# Patient Record
Sex: FEMALE | Race: BLACK OR AFRICAN AMERICAN | NOT HISPANIC OR LATINO | URBAN - METROPOLITAN AREA
[De-identification: names, ages, dates, MRNs, and addresses within clinical notes are randomized per-mention and may not be internally consistent; named-entity substitution may affect disease eponyms.]

---

## 2023-10-10 ENCOUNTER — LAB OUTSIDE AN ENCOUNTER (OUTPATIENT)
Dept: URBAN - METROPOLITAN AREA CLINIC 128 | Facility: CLINIC | Age: 48
End: 2023-10-10

## 2023-10-10 ENCOUNTER — OFFICE VISIT (OUTPATIENT)
Dept: URBAN - METROPOLITAN AREA CLINIC 128 | Facility: CLINIC | Age: 48
End: 2023-10-10
Payer: COMMERCIAL

## 2023-10-10 VITALS
SYSTOLIC BLOOD PRESSURE: 170 MMHG | BODY MASS INDEX: 43.65 KG/M2 | HEIGHT: 65 IN | HEART RATE: 75 BPM | WEIGHT: 262 LBS | TEMPERATURE: 97.9 F | DIASTOLIC BLOOD PRESSURE: 90 MMHG

## 2023-10-10 DIAGNOSIS — K58.2 IRRITABLE BOWEL SYNDROME WITH BOTH CONSTIPATION AND DIARRHEA: ICD-10-CM

## 2023-10-10 DIAGNOSIS — E11.9 TYPE 2 DIABETES MELLITUS WITHOUT COMPLICATION, WITHOUT LONG-TERM CURRENT USE OF INSULIN: ICD-10-CM

## 2023-10-10 DIAGNOSIS — K21.9 GASTROESOPHAGEAL REFLUX DISEASE, UNSPECIFIED WHETHER ESOPHAGITIS PRESENT: ICD-10-CM

## 2023-10-10 PROBLEM — 10743008: Status: ACTIVE | Noted: 2023-10-10

## 2023-10-10 PROBLEM — 313436004: Status: ACTIVE | Noted: 2023-10-10

## 2023-10-10 PROCEDURE — 99205 OFFICE O/P NEW HI 60 MIN: CPT | Performed by: STUDENT IN AN ORGANIZED HEALTH CARE EDUCATION/TRAINING PROGRAM

## 2023-10-10 RX ORDER — VALACYCLOVIR HCL 500 MG
TABLET ORAL
Qty: 0 | Refills: 0 | Status: ACTIVE | COMMUNITY
Start: 1900-01-01 | End: 1900-01-01

## 2023-10-10 NOTE — HPI-TODAY'S VISIT:
10/10/2023:  Tomasz: The pt is a 47 yo F who presents for concerns of reflux and colonoscopy.  Symptoms ongoing for 15 years Symptoms  IBS and GERD.  Dysphagia with water.  Satiety.  Fullness, constipation.    Indigestion.  If she is not getting the shots, she notices constipation.  Loose stools.  Has been on metformin.  Medications Ozempic (4 months ago), Buspar, lisinorpril, omeprazole and atrovastatin (In oct)was introduced. Medications that helped Prior endoscopy  13 years ago was normal. Colonoscopy was about 5 years ago with dr. Ja sawyer and was normal except for internal hemorrhoids.  Recommended a 10 year follow up.  Smoking hx None Alcohol use  wine (Merlot or cabernet) measuring 5 oz daily with dinner - 1 glass.  cocktails in addition. Other recreational drugs none No concerns  for liver disease. FH of luminal GI cancers:  Father  in his 40s.  Alcohol and esophageal cancer.

## 2023-11-03 ENCOUNTER — OFFICE VISIT (OUTPATIENT)
Dept: URBAN - METROPOLITAN AREA CLINIC 128 | Facility: CLINIC | Age: 48
End: 2023-11-03

## 2023-12-06 ENCOUNTER — OUT OF OFFICE VISIT (OUTPATIENT)
Dept: URBAN - METROPOLITAN AREA SURGERY CENTER 31 | Facility: SURGERY CENTER | Age: 48
End: 2023-12-06
Payer: COMMERCIAL

## 2023-12-06 ENCOUNTER — TELEPHONE ENCOUNTER (OUTPATIENT)
Dept: URBAN - METROPOLITAN AREA CLINIC 19 | Facility: CLINIC | Age: 48
End: 2023-12-06

## 2023-12-06 DIAGNOSIS — K31.89 ACHYLIA: ICD-10-CM

## 2023-12-06 DIAGNOSIS — K22.9 IRREGULAR Z LINE OF ESOPHAGUS: ICD-10-CM

## 2023-12-06 DIAGNOSIS — Z12.11 COLON CANCER SCREENING (HIGH RISK): ICD-10-CM

## 2023-12-06 DIAGNOSIS — K21.00 ALKALINE REFLUX ESOPHAGITIS: ICD-10-CM

## 2023-12-06 DIAGNOSIS — K21.9 GASTRO - ESOPHAGEAL REFLUX DISEASE: ICD-10-CM

## 2023-12-06 DIAGNOSIS — K20.80 ESOPHAGITIS, LOS ANGELES GRADE B: ICD-10-CM

## 2023-12-06 DIAGNOSIS — Z12.11 COLON CANCER SCREENING: ICD-10-CM

## 2023-12-06 PROCEDURE — 00813 ANES UPR LWR GI NDSC PX: CPT | Performed by: NURSE ANESTHETIST, CERTIFIED REGISTERED

## 2023-12-06 PROCEDURE — 43239 EGD BIOPSY SINGLE/MULTIPLE: CPT | Performed by: STUDENT IN AN ORGANIZED HEALTH CARE EDUCATION/TRAINING PROGRAM

## 2023-12-06 PROCEDURE — G8907 PT DOC NO EVENTS ON DISCHARG: HCPCS | Performed by: STUDENT IN AN ORGANIZED HEALTH CARE EDUCATION/TRAINING PROGRAM

## 2023-12-06 PROCEDURE — 45378 DIAGNOSTIC COLONOSCOPY: CPT | Performed by: STUDENT IN AN ORGANIZED HEALTH CARE EDUCATION/TRAINING PROGRAM

## 2023-12-06 RX ORDER — OMEPRAZOLE 40 MG/1
1 CAPSULE 30 MINUTES BEFORE MORNING MEAL CAPSULE, DELAYED RELEASE ORAL ONCE A DAY
Qty: 30 | Refills: 3 | OUTPATIENT
Start: 2023-12-06

## 2023-12-06 RX ORDER — VALACYCLOVIR HCL 500 MG
TABLET ORAL
Qty: 0 | Refills: 0 | Status: ACTIVE | COMMUNITY
Start: 1900-01-01 | End: 1900-01-01

## 2024-01-03 ENCOUNTER — TELEPHONE ENCOUNTER (OUTPATIENT)
Dept: URBAN - METROPOLITAN AREA CLINIC 128 | Facility: CLINIC | Age: 49
End: 2024-01-03

## 2024-01-08 ENCOUNTER — TELEPHONE ENCOUNTER (OUTPATIENT)
Dept: URBAN - METROPOLITAN AREA CLINIC 86 | Facility: CLINIC | Age: 49
End: 2024-01-08

## 2024-01-08 PROBLEM — 44054006: Status: ACTIVE | Noted: 2024-01-08

## 2024-01-08 PROBLEM — 59621000: Status: ACTIVE | Noted: 2024-01-08

## 2024-01-08 PROBLEM — 267434003: Status: ACTIVE | Noted: 2024-01-08

## 2024-01-08 PROBLEM — 266433003: Status: ACTIVE | Noted: 2024-01-08

## 2024-01-08 PROBLEM — 443913008: Status: ACTIVE | Noted: 2024-01-08

## 2024-01-08 PROBLEM — 453861000124107: Status: ACTIVE | Noted: 2024-01-08

## 2024-01-08 PROBLEM — 238136002: Status: ACTIVE | Noted: 2024-01-08

## 2024-01-08 NOTE — HPI-TODAY'S VISIT:
Patient is a 48-year-old female who we are being asked to see for abnormal liver labs.A copy of the note will be sent to the referring provider. Patient was last seen in 2023 by Dr. Tolbert at the [place] office for evaluation of GERD/colonoscopy consultation. Patient listed as being on Ozempic at the time for her diabetes and they talked about some timing of it to be held for her procedure. They mention that she has a history of elevated BMI and type 2 diabetes and was at risk for fatty liver. Weight at the time was listed as being 262 with a height of 65 and BMI 43.59. Patient listed as having potential risk factors for fatty liver being her obesity, and the diabetes issues.  EGD done showed grade B esophagitis in the stomach appeared normal as well as the duodenum.  There was some scalloped mucosa in the first part of duodenum with scalloping close the second part duodenum and biopsies were done to rule out celiac disease.  Colonoscopy also done same day showed exam was otherwise normal and no specimens were obtained from the colon.  Path report showed from duodenum small bowel mucosa with no significant histopathology and no increase in intra epithelial lymphocytes and no evidence of celiac disease or infectious microorganisms.  Distal esophagus biopsies showed some changes of reflux esophagitis and squamocolumnar junctional mucosa. Approximately 23 pages of material were sent from Dr. AYERS's office for review. On 2023 patient noted to have glucose 107 BUN of 12 creatinine 0.72 sodium 142 potassium 4.7 chloride 104 CO2 23 calcium 9.9 albumin 4.5 bilirubin 0.4 alk phos 134 AST of 24 and ALT of 47.  Hep B IgM negative B surface antigen negative B core IgM negative and hep C antibody negative.  Alkaline phosphatase was fractionated as it was elevated and was 44% liver, bone 17% and curiously 39% elevated from intestinal fraction. Other labs that we saw showed glucose 158, BUN of 10 creatinine 0.74 sodium 138 potassium 4.2 albumin 3.9 bilirubin 0.5 alk phos 125 AST of 22 ALT 45 cholesterol 110 triglycerides 118 HDL 45 LDL 44 and these appear to be from .  A1c was 6.2%. Last clinic visit note that we could see here from January shows the patient has a height of 65 inches and a weight of 261 and a BMI of 43.43. Medical problems this recent upper respiratory tract infection, hypertension, right trapezius muscle spasm, morbid obesity, carpal tunnel syndrome bilaterally, history of migraines, diabetes, glaucoma, family history ovarian cancer, history of anxiety, history of HSV, history of hemorrhoids, history of GERD, and vitamin D deficiency. Meds listed included Ozempic she is on the 1 mg dose once a week, buspirone 10 mg once a day as needed, omeprazole 40 mg a day.  Lisinopril 2.5 mg a day and Lipitor 10 mg a day.  Cyclobenzaprine 10 mg twice a day as needed, metformin 500 mg once a day, Valtrex 500 mg once a day, and hydroxyzine 10 mg 3 times a day as needed. Allergies are to tramadol and Percocet and penicillin. Patient apparently when was seen was about to start her on Ozempic again as she had been off of it for about 6 to 8 weeks.  She was having some issues with the prior pharmacy. Past surgeries include  , ovary cyst surgery , EGD colonoscopy 2023. Social history not a smoker and occasional social alcohol.  Mainly in the form of wine. 1. fatty liver noted and patient certainly with risk factors for this in the form of morbid obesity as well as the diabetes and needs to work on her risk factors.  Current meds she is on now with both the diabetes as well as also with the weight loss aspect.  Need to see how she does over time and hopefully she will not have significant fibrosis noted.  The fib 4 index.  The ultrasound with elastography consider more advanced imaging as needed.  Hopefully with the treatment and continued care by the [facility] they can get this improved upon.  New medicines coming up possibly later this year for and we can see if she is a candidate for those when they are out. 2.  Morbid obesity noted and needs to continue to work on weight and exercise and see if she can lose at 5 to 10% or more and also see what she can do while doing so to indirectly help her diabetes and the other fatty liver risk factors.  Definitely with Ozempic and Mounjaro class of meds she can certainly lose substantial weight as well with those. 3.  Type 2 diabetes and working with team regarding same.  Certainly a major risk factor for fatty liver and 80% of people with diabetes will have a fatty liver.  With Ozempic she should hopefully do better on the diabetes as well as the weight loss aspect. 4.  Vaccine counseling needs check for hep A/B consider vaccination for same. 5.  High risk medicine usage noted on medications and follow-up. 6.  Hyperlipidemia on her statin therapy and follow-up. 7.  Hypertension noted on medications. 8.  History of colonoscopy with no findings in . 9.  EGD done in  with grade B esophagitis and on treatment for same. Plan 1.  Needs fib 4 index score to assess for fibrosis risk.2.  Needs ultrasound with elastography to assess for fibrosis estimate and to overall see liver size and echotexture.3.  Needs to work on identified risk factors such as weight, and her diabetes and keep this optimized is much as possible.4.  Exercise such as walking 30 minutes a day or cumulative 150 to 300 minutes a week to be quite helpful for this.5.  Need to follow course if she continues to proceed with her treatment options.6.  Plan to reassess patient again in about 2 months and see how they are doing to be TeleMed visit.7.  May need more advanced imaging pending above. Duration of the visit was minutes with 10 minutes of chart prep and 20 minutes of face to face/TeleMed visit with time spent reviewing historical and recent records, discussing their current status relative to same and reviewing future plans for the patient.

## 2024-01-09 ENCOUNTER — LAB OUTSIDE AN ENCOUNTER (OUTPATIENT)
Dept: URBAN - METROPOLITAN AREA CLINIC 86 | Facility: CLINIC | Age: 49
End: 2024-01-09

## 2024-01-09 ENCOUNTER — OFFICE VISIT (OUTPATIENT)
Dept: URBAN - METROPOLITAN AREA CLINIC 86 | Facility: CLINIC | Age: 49
End: 2024-01-09
Payer: COMMERCIAL

## 2024-01-09 VITALS
BODY MASS INDEX: 43.65 KG/M2 | WEIGHT: 262 LBS | SYSTOLIC BLOOD PRESSURE: 133 MMHG | TEMPERATURE: 97.1 F | DIASTOLIC BLOOD PRESSURE: 87 MMHG | HEIGHT: 65 IN | HEART RATE: 87 BPM

## 2024-01-09 DIAGNOSIS — K21.00 GASTROESOPHAGEAL REFLUX DISEASE WITH ESOPHAGITIS WITHOUT HEMORRHAGE: ICD-10-CM

## 2024-01-09 DIAGNOSIS — K76.0 FATTY LIVER: ICD-10-CM

## 2024-01-09 DIAGNOSIS — I10 ESSENTIAL HYPERTENSION: ICD-10-CM

## 2024-01-09 DIAGNOSIS — E66.01 MORBID OBESITY: ICD-10-CM

## 2024-01-09 PROCEDURE — 99215 OFFICE O/P EST HI 40 MIN: CPT

## 2024-01-09 PROCEDURE — 99245 OFF/OP CONSLTJ NEW/EST HI 55: CPT

## 2024-01-09 RX ORDER — LISINOPRIL 2.5 MG/1
1 TABLET TABLET ORAL ONCE A DAY
Status: ACTIVE | COMMUNITY

## 2024-01-09 RX ORDER — SEMAGLUTIDE 0.68 MG/ML
AS DIRECTED INJECTION, SOLUTION SUBCUTANEOUS
Status: ACTIVE | COMMUNITY

## 2024-01-09 RX ORDER — OMEPRAZOLE 40 MG/1
1 CAPSULE 30 MINUTES BEFORE MORNING MEAL CAPSULE, DELAYED RELEASE ORAL ONCE A DAY
Qty: 30 | Refills: 3 | COMMUNITY
Start: 2023-12-06

## 2024-01-09 RX ORDER — ATORVASTATIN CALCIUM 10 MG/1
1 TABLET TABLET, FILM COATED ORAL ONCE A DAY
Status: ACTIVE | COMMUNITY

## 2024-01-09 RX ORDER — VALACYCLOVIR HCL 500 MG
1 TABLET TABLET ORAL ONCE A DAY
Refills: 0 | Status: ON HOLD | COMMUNITY
Start: 1900-01-01

## 2024-01-09 RX ORDER — METFORMIN HYDROCHLORIDE 500 MG/1
1 TABLET WITH EVENING MEAL TABLET, EXTENDED RELEASE ORAL ONCE A DAY
Status: ACTIVE | COMMUNITY

## 2024-01-09 NOTE — EXAM-PHYSICAL EXAM
Gen: awake and responsive. Eyes: anicteric,normal lids. Mouth: has mask on. Nose: has mask on. Hearing: intact grossly. Neck: trachea midline and no jvd. CV: RRR no s3. Lungs: clear. No wheezes. Abd: Soft, nabs, nr,NT. No appreciable hsm. Ext: no sig edema, and some  palm erythema. Neuro: moves all 4 ext grossly. No asterixis. Skin: no pruritis and some palm erythema.

## 2024-01-09 NOTE — HPI-TODAY'S VISIT:
Patient is a 48-year-old female who we are being asked to see for abnormal liver labs and suspicious for fatty liver.  A copy of the note will be sent to the referring provider.  Patient was last seen in 2023 by Dr. Tolbert at the [place] office for evaluation of GERD/colonoscopy consultation.  Patient listed as being ordered to be on Ozempic at the time for her diabetes and they talked about some timing of it to be held for her procedure.  Since then she started the ozempic 0.25mg.  She started that dose .  She had been prior on it from 3m summer to  and she went to 1mg dose and then pharmacy had not been able to get that.  She lost 10 or so with thay.   [facility] notesL   They mention that she has a history of elevated BMI and type 2 diabetes and was at risk for fatty liver. They did not really discuss it.  Weight at the time was listed as being 262 with a height of 65 and BMI 43.59.  Patient listed as having potential risk factors for fatty liver being her obesity, and the pre/diabetes issues.  2023 EGD done showed grade B esophagitis in the stomach appeared normal as well as the duodenum.  There was some scalloped mucosa in the first part of duodenum with scalloping close the second part duodenum and biopsies were done to rule out celiac disease.  Colonoscopy also done same day showed exam was otherwise normal and no specimens were obtained from the colon.  Path report showed from duodenum small bowel mucosa with no significant histopathology and no increase in intra epithelial lymphocytes and no evidence of celiac disease or infectious microorganisms.  Distal esophagus biopsies showed some changes of reflux esophagitis and squamocolumnar junctional mucosa.  Approximately 23 pages of material were sent from Dr. AYERS's office for review.  End of dec 2023 ast 24 and alt 47 and alk 134.  On 2023 patient noted to have glucose 107 BUN of 12 creatinine 0.72 sodium 142 potassium 4.7 chloride 104 CO2 23 calcium 9.9 albumin 4.5 bilirubin 0.4 alk phos 134 AST of 24 and ALT of 47.  Hep B IgM negative B surface antigen negative B core IgM negative and hep C antibody negative.  Alkaline phosphatase was fractionated as it was elevated and was 44% liver, bone 17% and curiously 39% elevated from intestinal fraction. Other labs that we saw showed glucose 158, BUN of 10 creatinine 0.74 sodium 138 potassium 4.2 albumin 3.9 bilirubin 0.5 alk phos 125 AST of 22 ALT 45 cholesterol 110 triglycerides 118 HDL 45 LDL 44 and these appear to be from .  A1c was 6.2%.  Last clinic visit note that we could see here from January shows the patient has a height of 65 inches and a weight of 261 and a BMI of 43.43.  Medical problems this recent upper respiratory tract infection, hypertension, right trapezius muscle spasm, morbid obesity, carpal tunnel syndrome bilaterally, history of migraines, diabetes, glaucoma, family history ovarian cancer, history of anxiety, history of HSV, history of hemorrhoids, history of GERD, and vitamin D deficiency.  Meds listed included Ozempic she is on the 1 mg dose once a week, buspirone 10 mg once a day as needed, omeprazole 40 mg a day.  Lisinopril 2.5 mg a day and Lipitor 10 mg a day.  Cyclobenzaprine 10 mg twice a day as needed, metformin 500 mg once a day, Valtrex 500 mg once a day, and hydroxyzine 10 mg 3 times a day as needed.  Allergies are to tramadol and Percocet and penicillin.  Patient apparently when was seen was about to start her on Ozempic again as she had been off of it for about 6 to 8 weeks.  She was having some issues with the prior pharmacy.  Past surgeries include  , ovary cyst surgery , EGD colonoscopy 2023.  Social history not a smoker and occasional social alcohol.  Mainly in the form of wine.  Plan 1.  Needs fib 4 index score to assess for fibrosis risk.  2.  Needs ultrasound with elastography to assess for fibrosis estimate and to overall see liver size and echotexture.  3.  Needs to work on identified risk factors such as weight, and her diabetes and keep this optimized is much as possible. She is now back on ozempic and that will help. Labs lower when on ozempic earlier in year.  4.  Exercise such as walking 30 minutes a day or cumulative 150 to 300 minutes a week to be quite helpful for the fatty liver.  5.  Need to follow course and proceed with ozempic titration and that prior may have helped as labs lower earlier in the year.   6.  Plan to reassess patient again in about 2 months and see how they are doing to be TeleMed visit.  7.  May need more advanced imaging pending above.   Duration of the visit was 80 minutes with 40 minutes of chart prep and loading the labs and notes to chart from karely and Dr AYERS's office and then 40 minutes of face to face visit with time spent reviewing historical and recent records, discussing their current status relative to same and reviewing future plans for the patient.

## 2024-01-12 ENCOUNTER — WEB ENCOUNTER (OUTPATIENT)
Dept: URBAN - METROPOLITAN AREA CLINIC 128 | Facility: CLINIC | Age: 49
End: 2024-01-12

## 2024-01-30 ENCOUNTER — TELEPHONE ENCOUNTER (OUTPATIENT)
Dept: URBAN - METROPOLITAN AREA CLINIC 86 | Facility: CLINIC | Age: 49
End: 2024-01-30

## 2024-01-30 LAB
ABSOLUTE BASOPHILS: 33
ABSOLUTE EOSINOPHILS: 150
ABSOLUTE LYMPHOCYTES: 2457
ABSOLUTE MONOCYTES: 605
ABSOLUTE NEUTROPHILS: 3257
ALBUMIN/GLOBULIN RATIO: 1.6
ALBUMIN/GLOBULIN RATIO: 1.6
ALBUMIN: 4.3
ALBUMIN: 4.3
ALKALINE PHOSPHATASE: 107
ALKALINE PHOSPHATASE: 107
ALT (SGPT): 26
ALT: 26
AST (SGOT): 16
AST: 16
BASOPHILS: 0.5
BILIRUBIN, DIRECT: 0.2
BILIRUBIN, INDIRECT: 0.4
BILIRUBIN, TOTAL: 0.6
BILIRUBIN, TOTAL: 0.6
BUN/CREATININE RATIO: (no result)
CALCIUM: 9.8
CARBON DIOXIDE: 30
CHLORIDE: 101
CREATININE: 0.69
EGFR: 106
EOSINOPHILS: 2.3
FIB 4 INDEX: 0.58
FIB 4 INTERPRETATION: (no result)
GLOBULIN: 2.7
GLOBULIN: 2.7
GLUCOSE: 95
HEMATOCRIT: 37.5
HEMOGLOBIN: 12.8
HEPATITIS A AB, TOTAL: (no result)
HEPATITIS B CORE AB TOTAL: (no result)
HEPATITIS B SURFACE AB IMMUNITY, QN: <5
HEPATITIS B SURFACE ANTIGEN: (no result)
HEPATITIS C ANTIBODY: (no result)
LYMPHOCYTES: 37.8
MCH: 31
MCHC: 34.1
MCV: 90.8
MONOCYTES: 9.3
MPV: 12.1
NEUTROPHILS: 50.1
PLATELET COUNT: 271
PLATELET COUNT: 271
POTASSIUM: 4.6
PROTEIN, TOTAL: 7
PROTEIN, TOTAL: 7
RDW: 12.2
RED BLOOD CELL COUNT: 4.13
SODIUM: 137
UREA NITROGEN (BUN): 10
WHITE BLOOD CELL COUNT: 6.5

## 2024-01-30 NOTE — HPI-TODAY'S VISIT:
Dear Rebecca Manrique, Jan 29: hepatitis c ab was not reactive and good to note. Hep b surface antigen not reactive. Hepattiis b core ab total negative indicating no prior exposure to this. Hepatitis B surface ab not immune and need to consider getting hepatitis b vaccine series. No immunity to hepatitis A seen and consider getting hepatitis A vaccine series. There is a combined a and b vaccine series to help cover both called Twinrix. Total protein 7.0 and alb 4.3 and tb 0.6 and direct 0.2 and alk phos 107. ast 16 and alt 26 and idea alt less than 25. Fib 4 index 0.58 for advanced fibrosis as it is less than 1.3. Glucose was 95 BUN of 10 creatinine 0.69 sodium 137 potassium 4.6 calcium 9.8 albumin 4.3. Platelet count was 271.  WBC 6.5 hemoglobin 12.8 platelet count was normal at 271.  MCV was 90.8 with normal neutrophils and lymphocytes. In summary, I am pleased to see that the fib 4 index was low suggesting that you  have a low risk to have advanced fibrosis.  The liver labs are actually almost at ideal range and that the ALT is 26 just above the cutoff of 25 for ideal. We need to get the hepatitis A and B vaccine series to be considered to get immunity for that and we await that ultrasound with elastography to assess for fibrosis estimate to be done. Please share with local providers. Dr Morrow

## 2024-02-01 ENCOUNTER — OV EP (OUTPATIENT)
Dept: URBAN - METROPOLITAN AREA CLINIC 19 | Facility: CLINIC | Age: 49
End: 2024-02-01
Payer: COMMERCIAL

## 2024-02-01 ENCOUNTER — LAB (OUTPATIENT)
Dept: URBAN - METROPOLITAN AREA CLINIC 19 | Facility: CLINIC | Age: 49
End: 2024-02-01

## 2024-02-01 VITALS
SYSTOLIC BLOOD PRESSURE: 140 MMHG | OXYGEN SATURATION: 98 % | BODY MASS INDEX: 44.28 KG/M2 | HEART RATE: 83 BPM | HEIGHT: 65 IN | DIASTOLIC BLOOD PRESSURE: 88 MMHG | TEMPERATURE: 97.2 F | WEIGHT: 265.8 LBS

## 2024-02-01 DIAGNOSIS — I10 ESSENTIAL HYPERTENSION: ICD-10-CM

## 2024-02-01 DIAGNOSIS — E66.01 MORBID OBESITY: ICD-10-CM

## 2024-02-01 DIAGNOSIS — K58.2 IRRITABLE BOWEL SYNDROME WITH BOTH CONSTIPATION AND DIARRHEA: ICD-10-CM

## 2024-02-01 DIAGNOSIS — K76.0 FATTY LIVER: ICD-10-CM

## 2024-02-01 PROCEDURE — 99215 OFFICE O/P EST HI 40 MIN: CPT | Performed by: STUDENT IN AN ORGANIZED HEALTH CARE EDUCATION/TRAINING PROGRAM

## 2024-02-01 RX ORDER — METFORMIN HYDROCHLORIDE 500 MG/1
1 TABLET WITH EVENING MEAL TABLET, EXTENDED RELEASE ORAL ONCE A DAY
Status: ACTIVE | COMMUNITY

## 2024-02-01 RX ORDER — LISINOPRIL 2.5 MG/1
1 TABLET TABLET ORAL ONCE A DAY
Status: ACTIVE | COMMUNITY

## 2024-02-01 RX ORDER — SEMAGLUTIDE 0.68 MG/ML
AS DIRECTED INJECTION, SOLUTION SUBCUTANEOUS
Status: ACTIVE | COMMUNITY

## 2024-02-01 RX ORDER — OMEPRAZOLE 40 MG/1
1 CAPSULE 30 MINUTES BEFORE MORNING MEAL CAPSULE, DELAYED RELEASE ORAL ONCE A DAY
Qty: 30 | Refills: 3 | COMMUNITY
Start: 2023-12-06

## 2024-02-01 RX ORDER — VALACYCLOVIR HCL 500 MG
1 TABLET TABLET ORAL ONCE A DAY
Refills: 0 | Status: ON HOLD | COMMUNITY
Start: 1900-01-01

## 2024-02-01 RX ORDER — ATORVASTATIN CALCIUM 10 MG/1
1 TABLET TABLET, FILM COATED ORAL ONCE A DAY
Status: ACTIVE | COMMUNITY

## 2024-02-01 NOTE — HPI-TODAY'S VISIT:
2024:  Tomasz: 47 yo F previously seen by me for colonoscopy consult, with some reflux/pyrosis symptoms returns to follow up.  Initially when being evaluated for the procedures, noting a h/o alcohol use and a h/o diabetes, we had briefly discussed that she should be careful with hepatotoxic agents, given her increased risk factors for likelihood for developing NAFLD.  Since then she was seen and evaluated by a hepatologist.  FIB-4 score noted at 0.58 and pending elastography.  She has been told to obtain Hep A+B vaccines.  During the EGD endoscopically, I thought I saw scalloping.  She had also endorsed IBS symptoms.  Since celiac disease is a confounder for IBS, we took some duodenal biopsies.  However the biopsies did not show histopathological features concerning for celiac disease.  Having no prior labs from the PCP available to me, I was not aware of any concerns for anemia or celiac disease, but took biopsies based on what I saw endoscopically.  This was explained to the patient and spouse.  We also discussed that the patient abstain alcohol or minimize since she now had features of NAFLD without any concerns for fibrosis, based on labs.  They asked if they should pursue elastography, and I recommended that they do, especially as it was recommended by her hepatologist.  We discussed minimizing other hepatotoxic agents including unnecessary supplements and OTC meds.     Pt has been on ozempic, although she has not taken it recently, due to supply as well as increase in co-pay until her deductible has been met.  I suggested she look into the oral form of the medication, as the supply may be more stable and it make be less expensive of an option.  I encouraged ongoing wt loss, with pharmacotherapy, exercise, particularly weight training and dietary modifications.  Lastly we revisited the use of metamucil to stablize her stools. She suspects the ozempic constipates her and the metformin causes diarrhea, so that she has a bm daily.  We talked about starting at a low dose of fiber supplement such as metamucil and then increase the dosage.  We talked about pro- and pre-biotics and their benefits, as well as continuing omeprazole for now as it helps with her PUD.   ==================  2024:  Cristhian: Patient is a 48-year-old female who we are being asked to see for abnormal liver labs and suspicious for fatty liver. A copy of the note will be sent to the referring provider. Patient was last seen in 2023 by Dr. Tolbert at the [place] office for evaluation of GERD/colonoscopy consultation. Patient listed as being ordered to be on Ozempic at the time for her diabetes and they talked about some timing of it to be held for her procedure. Since then she started the ozempic 0.25mg.  She started that dose . She had been prior on it from 3m summer to  and she went to 1mg dose and then pharmacy had not been able to get that.  She lost 10 or so with thay.   [facility] notesL   They mention that she has a history of elevated BMI and type 2 diabetes and was at risk for fatty liver. They did not really discuss it. Weight at the time was listed as being 262 with a height of 65 and BMI 43.59. Patient listed as having potential risk factors for fatty liver being her obesity, and the pre/diabetes issues. 2023 EGD done showed grade B esophagitis in the stomach appeared normal as well as the duodenum.  There was some scalloped mucosa in the first part of duodenum with scalloping close the second part duodenum and biopsies were done to rule out celiac disease.  Colonoscopy also done same day showed exam was otherwise normal and no specimens were obtained from the colon.  Path report showed from duodenum small bowel mucosa with no significant histopathology and no increase in intra epithelial lymphocytes and no evidence of celiac disease or infectious microorganisms.  Distal esophagus biopsies showed some changes of reflux esophagitis and squamocolumnar junctional mucosa. Approximately 23 pages of material were sent from Dr. AYERS's office for review. End of dec 2023 ast 24 and alt 47 and alk 134.  On 2023 patient noted to have glucose 107 BUN of 12 creatinine 0.72 sodium 142 potassium 4.7 chloride 104 CO2 23 calcium 9.9 albumin 4.5 bilirubin 0.4 alk phos 134 AST of 24 and ALT of 47.  Hep B IgM negative B surface antigen negative B core IgM negative and hep C antibody negative.  Alkaline phosphatase was fractionated as it was elevated and was 44% liver, bone 17% and curiously 39% elevated from intestinal fraction. Other labs that we saw showed glucose 158, BUN of 10 creatinine 0.74 sodium 138 potassium 4.2 albumin 3.9 bilirubin 0.5 alk phos 125 AST of 22 ALT 45 cholesterol 110 triglycerides 118 HDL 45 LDL 44 and these appear to be from .  A1c was 6.2%.  Last clinic visit note that we could see here from January shows the patient has a height of 65 inches and a weight of 261 and a BMI of 43.43.  Medical problems this recent upper respiratory tract infection, hypertension, right trapezius muscle spasm, morbid obesity, carpal tunnel syndrome bilaterally, history of migraines, diabetes, glaucoma, family history ovarian cancer, history of anxiety, history of HSV, history of hemorrhoids, history of GERD, and vitamin D deficiency.  Meds listed included Ozempic she is on the 1 mg dose once a week, buspirone 10 mg once a day as needed, omeprazole 40 mg a day.  Lisinopril 2.5 mg a day and Lipitor 10 mg a day.  Cyclobenzaprine 10 mg twice a day as needed, metformin 500 mg once a day, Valtrex 500 mg once a day, and hydroxyzine 10 mg 3 times a day as needed.  Allergies are to tramadol and Percocet and penicillin.  Patient apparently when was seen was about to start her on Ozempic again as she had been off of it for about 6 to 8 weeks.  She was having some issues with the prior pharmacy.  Past surgeries include  , ovary cyst surgery , EGD colonoscopy 2023.  Social history not a smoker and occasional social alcohol.  Mainly in the form of wine.  Plan 1.  Needs fib 4 index score to assess for fibrosis risk.  2.  Needs ultrasound with elastography to assess for fibrosis estimate and to overall see liver size and echotexture.  3.  Needs to work on identified risk factors such as weight, and her diabetes and keep this optimized is much as possible. She is now back on ozempic and that will help. Labs lower when on ozempic earlier in year.  4.  Exercise such as walking 30 minutes a day or cumulative 150 to 300 minutes a week to be quite helpful for the fatty liver.  5.  Need to follow course and proceed with ozempic titration and that prior may have helped as labs lower earlier in the year.   6.  Plan to reassess patient again in about 2 months and see how they are doing to be TeleMed visit.  7.  May need more advanced imaging pending above.   Duration of the visit was 80 minutes with 40 minutes of chart prep and loading the labs and notes to chart from karely and Dr AYERS's office and then 40 minutes of face to face visit with time spent reviewing historical and recent records, discussing their current status relative to same and reviewing future plans for the patient.

## 2024-02-01 NOTE — EXAM-FUNCTIONAL ASSESSMENT
Patient wearing mask due to COVID-19  General--no acute distress, resting comfortably Eyes--anicteric, no pallor HENT--normocephalic, atraumatic head Neck--no lymphadenopathy, non tender Chest--normal breath sounds, equal rise Heart--regular rate and rhythm Abdomen--soft, non tender, non distended, central adiposity, bowel sounds present, no hepatomegaly

## 2024-02-09 ENCOUNTER — HEP (OUTPATIENT)
Dept: URBAN - METROPOLITAN AREA CLINIC 18 | Facility: CLINIC | Age: 49
End: 2024-02-09
Payer: COMMERCIAL

## 2024-02-09 DIAGNOSIS — Z23 ENCOUNTER FOR IMMUNIZATION: ICD-10-CM

## 2024-02-09 PROCEDURE — 90636 HEP A/HEP B VACC ADULT IM: CPT | Performed by: STUDENT IN AN ORGANIZED HEALTH CARE EDUCATION/TRAINING PROGRAM

## 2024-02-09 PROCEDURE — 90471 IMMUNIZATION ADMIN: CPT | Performed by: STUDENT IN AN ORGANIZED HEALTH CARE EDUCATION/TRAINING PROGRAM

## 2024-02-09 RX ORDER — OMEPRAZOLE 40 MG/1
1 CAPSULE 30 MINUTES BEFORE MORNING MEAL CAPSULE, DELAYED RELEASE ORAL ONCE A DAY
Qty: 30 | Refills: 3 | COMMUNITY
Start: 2023-12-06

## 2024-02-09 RX ORDER — LISINOPRIL 2.5 MG/1
1 TABLET TABLET ORAL ONCE A DAY
Status: ACTIVE | COMMUNITY

## 2024-02-09 RX ORDER — METFORMIN HYDROCHLORIDE 500 MG/1
1 TABLET WITH EVENING MEAL TABLET, EXTENDED RELEASE ORAL ONCE A DAY
Status: ACTIVE | COMMUNITY

## 2024-02-09 RX ORDER — ATORVASTATIN CALCIUM 10 MG/1
1 TABLET TABLET, FILM COATED ORAL ONCE A DAY
Status: ACTIVE | COMMUNITY

## 2024-02-09 RX ORDER — VALACYCLOVIR HCL 500 MG
1 TABLET TABLET ORAL ONCE A DAY
Refills: 0 | Status: ON HOLD | COMMUNITY
Start: 1900-01-01

## 2024-02-09 RX ORDER — SEMAGLUTIDE 0.68 MG/ML
AS DIRECTED INJECTION, SOLUTION SUBCUTANEOUS
Status: ACTIVE | COMMUNITY

## 2024-02-12 ENCOUNTER — LAB (OUTPATIENT)
Dept: URBAN - METROPOLITAN AREA CLINIC 86 | Facility: CLINIC | Age: 49
End: 2024-02-12

## 2024-03-08 ENCOUNTER — HEP (OUTPATIENT)
Dept: URBAN - METROPOLITAN AREA CLINIC 18 | Facility: CLINIC | Age: 49
End: 2024-03-08
Payer: COMMERCIAL

## 2024-03-08 DIAGNOSIS — Z23 ENCOUNTER FOR VACCINATION: ICD-10-CM

## 2024-03-08 PROCEDURE — 90471 IMMUNIZATION ADMIN: CPT | Performed by: STUDENT IN AN ORGANIZED HEALTH CARE EDUCATION/TRAINING PROGRAM

## 2024-03-08 PROCEDURE — 90636 HEP A/HEP B VACC ADULT IM: CPT | Performed by: STUDENT IN AN ORGANIZED HEALTH CARE EDUCATION/TRAINING PROGRAM

## 2024-03-08 RX ORDER — LISINOPRIL 2.5 MG/1
1 TABLET TABLET ORAL ONCE A DAY
Status: ACTIVE | COMMUNITY

## 2024-03-08 RX ORDER — ATORVASTATIN CALCIUM 10 MG/1
1 TABLET TABLET, FILM COATED ORAL ONCE A DAY
Status: ACTIVE | COMMUNITY

## 2024-03-08 RX ORDER — METFORMIN HYDROCHLORIDE 500 MG/1
1 TABLET WITH EVENING MEAL TABLET, EXTENDED RELEASE ORAL ONCE A DAY
Status: ACTIVE | COMMUNITY

## 2024-03-08 RX ORDER — SEMAGLUTIDE 0.68 MG/ML
AS DIRECTED INJECTION, SOLUTION SUBCUTANEOUS
Status: ACTIVE | COMMUNITY

## 2024-03-08 RX ORDER — OMEPRAZOLE 40 MG/1
1 CAPSULE 30 MINUTES BEFORE MORNING MEAL CAPSULE, DELAYED RELEASE ORAL ONCE A DAY
Qty: 30 | Refills: 3 | COMMUNITY
Start: 2023-12-06

## 2024-03-08 RX ORDER — VALACYCLOVIR HCL 500 MG
1 TABLET TABLET ORAL ONCE A DAY
Refills: 0 | Status: ON HOLD | COMMUNITY
Start: 1900-01-01

## 2024-03-13 ENCOUNTER — OV EP (OUTPATIENT)
Dept: URBAN - METROPOLITAN AREA CLINIC 86 | Facility: CLINIC | Age: 49
End: 2024-03-13
Payer: COMMERCIAL

## 2024-03-13 VITALS
HEART RATE: 86 BPM | SYSTOLIC BLOOD PRESSURE: 137 MMHG | DIASTOLIC BLOOD PRESSURE: 88 MMHG | HEIGHT: 65 IN | BODY MASS INDEX: 44.82 KG/M2 | TEMPERATURE: 97.1 F | WEIGHT: 269 LBS

## 2024-03-13 DIAGNOSIS — E66.01 MORBID OBESITY: ICD-10-CM

## 2024-03-13 DIAGNOSIS — I10 ESSENTIAL HYPERTENSION: ICD-10-CM

## 2024-03-13 DIAGNOSIS — E78.2 MIXED HYPERLIPIDEMIA: ICD-10-CM

## 2024-03-13 DIAGNOSIS — E11.69 TYPE 2 DIABETES MELLITUS WITH OTHER SPECIFIED COMPLICATION, WITHOUT LONG-TERM CURRENT USE OF INSULIN: ICD-10-CM

## 2024-03-13 DIAGNOSIS — K21.00 GASTROESOPHAGEAL REFLUX DISEASE WITH ESOPHAGITIS WITHOUT HEMORRHAGE: ICD-10-CM

## 2024-03-13 DIAGNOSIS — K76.0 FATTY LIVER: ICD-10-CM

## 2024-03-13 DIAGNOSIS — Z71.85 VACCINE COUNSELING: ICD-10-CM

## 2024-03-13 DIAGNOSIS — Z79.899 HIGH RISK MEDICATION USE: ICD-10-CM

## 2024-03-13 PROCEDURE — 99215 OFFICE O/P EST HI 40 MIN: CPT

## 2024-03-13 RX ORDER — ORAL SEMAGLUTIDE 3 MG/1
AS DIRECTED TABLET ORAL
Status: ACTIVE | COMMUNITY

## 2024-03-13 RX ORDER — SEMAGLUTIDE 0.68 MG/ML
AS DIRECTED INJECTION, SOLUTION SUBCUTANEOUS
Status: DISCONTINUED | COMMUNITY

## 2024-03-13 RX ORDER — LISINOPRIL 2.5 MG/1
1 TABLET TABLET ORAL ONCE A DAY
Status: ACTIVE | COMMUNITY

## 2024-03-13 RX ORDER — VALACYCLOVIR HCL 500 MG
1 TABLET TABLET ORAL ONCE A DAY
Refills: 0 | Status: ON HOLD | COMMUNITY
Start: 1900-01-01

## 2024-03-13 RX ORDER — ATORVASTATIN CALCIUM 10 MG/1
1 TABLET TABLET, FILM COATED ORAL ONCE A DAY
Status: ACTIVE | COMMUNITY

## 2024-03-13 RX ORDER — METFORMIN HYDROCHLORIDE 500 MG/1
1 TABLET WITH EVENING MEAL TABLET, EXTENDED RELEASE ORAL ONCE A DAY
Status: ACTIVE | COMMUNITY

## 2024-03-13 RX ORDER — OMEPRAZOLE 40 MG/1
1 CAPSULE 30 MINUTES BEFORE MORNING MEAL CAPSULE, DELAYED RELEASE ORAL ONCE A DAY
Qty: 30 | Refills: 3 | Status: ACTIVE | COMMUNITY
Start: 2023-12-06

## 2024-03-13 NOTE — HPI-TODAY'S VISIT:
Patient is a 48-year-old female who we are being asked to see 2024 for abnormal liver labs and suspicious for fatty liver.  A copy of the note will be sent to the referring provider.   u.s:Elastography showed :Site 1: median 3.68 kPa and mean 3.58 kPa with IQR 0.36 and desired less than 0.3. Site 2: median 0.58 kPa and Mean 1.67 kpa and IQR 0.22 and desired less than 0.3. They felt that Site 2 values were adequate and mild or no stiffness category of less than 5.4 kPa as those were felt to be the more desired reading quality. The standard ultrasound portion ultrasound report: Liver measures 16.6 cm and was moderately and diffusely increased hepatic parenchymal echotexture most consistent with fatty liver. No focal lesions were seen. No intrahepatic bile duct dilation seen. Liver vessels were patent with normal directional flow. Hepatic artery was also patent. Splenic vessels were patent. Visualized portions of the IVC were patent. Gallbladder showed no gallstones or gallbladder sludge. Mg sign was negative. Common bile duct was 0.4 cm which is within normal limits. Right kidney measured 11.4 cm and left kidney 11.7 cm with no focal renal lesions. No hydronephrosis. Visualized portions of pancreas appeared unremarkable. Spleen was 7.6 cm with no focal splenic lesions. Visualized portions of the abdominal aorta and IVC again appeared unremarkable. In summary, they felt that the liver had fatty liver findings. We will discuss this further with you at your visit.  : hepatitis c ab was not reactive and good to note. Hep b surface antigen not reactive. Hepattiis b core ab total negative indicating no prior exposure to this. Hepatitis B surface ab not immune and need to consider getting hepatitis b vaccine series. No immunity to hepatitis A seen and consider getting hepatitis A vaccine series. There is a combined a and b vaccine series to help cover both called Twinrix. Total protein 7.0 and alb 4.3 and tb 0.6 and direct 0.2 and alk phos 107. ast 16 and alt 26 and idea alt less than 25. Fib 4 index 0.58 for advanced fibrosis as it is less than 1.3. Glucose was 95 BUN of 10 creatinine 0.69 sodium 137 potassium 4.6 calcium 9.8 albumin 4.3. Platelet count was 271.  WBC 6.5 hemoglobin 12.8 platelet count was normal at 271.  MCV was 90.8 with normal neutrophils and lymphocytes. In summary, I am pleased to see that the fib 4 index was low suggesting that you  have a low risk to have advanced fibrosis.  The liver labs are actually almost at ideal range and that the ALT is 26 just above the cutoff of 25 for ideal. We need to get the hepatitis A and B vaccine series to be considered to get immunity for that and we await that ultrasound with elastography to assess for fibrosis estimate to be done.   Patient was last seen in 2023 by Dr. Tolbert at the [place] office for evaluation of GERD/colonoscopy consultation.  Patient listed as being ordered to be on Ozempic at the time for her diabetes and they talked about some timing of it to be held for her procedure.  Since then she started the ozempic 0.25mg.  She started that dose  and now back off it and cost was issue and so not on it and trial rebylsus for this and has helped.   She had been prior on it from 3m summer to  and she went to 1mg dose and then pharmacy had not been able to get that.  She lost 10 or so with that ozempic tx.  Never titrated.  Ozempic and mounjaro looking gong label soon this or next year.  [facility] notes:  They mention that she has a history of elevated BMI and type 2 diabetes and was at risk for fatty liver. They did not really discuss it.  Weight at the time was listed as being 262 with a height of 65 and BMI 43.59.  /  Patient listed as having potential risk factors for fatty liver being her obesity, and the pre/diabetes issues.  2023 EGD done showed grade B esophagitis in the stomach appeared normal as well as the duodenum.  There was some scalloped mucosa in the first part of duodenum with scalloping close the second part duodenum and biopsies were done to rule out celiac disease.  Colonoscopy also done same day showed exam was otherwise normal and no specimens were obtained from the colon.  Path report showed from duodenum small bowel mucosa with no significant histopathology and no increase in intra epithelial lymphocytes and no evidence of celiac disease or infectious microorganisms.  Distal esophagus biopsies showed some changes of reflux esophagitis and squamocolumnar junctional mucosa.  Approximately 23 pages of material were sent from Dr. AYERS's office for review.  End of dec 2023 ast 24 and alt 47 and alk 134.  On 2023 patient noted to have glucose 107 BUN of 12 creatinine 0.72 sodium 142 potassium 4.7 chloride 104 CO2 23 calcium 9.9 albumin 4.5 bilirubin 0.4 alk phos 134 AST of 24 and ALT of 47.  Hep B IgM negative B surface antigen negative B core IgM negative and hep C antibody negative.  Alkaline phosphatase was fractionated as it was elevated and was 44% liver, bone 17% and curiously 39% elevated from intestinal fraction. Other labs that we saw showed glucose 158, BUN of 10 creatinine 0.74 sodium 138 potassium 4.2 albumin 3.9 bilirubin 0.5 alk phos 125 AST of 22 ALT 45 cholesterol 110 triglycerides 118 HDL 45 LDL 44 and these appear to be from .  A1c was 6.2%.  Last clinic visit note that we could see here from January shows the patient has a height of 65 inches and a weight of 261 and a BMI of 43.43.  Medical problems this recent upper respiratory tract infection, hypertension, right trapezius muscle spasm, morbid obesity, carpal tunnel syndrome bilaterally, history of migraines, diabetes, glaucoma, family history ovarian cancer, history of anxiety, history of HSV, history of hemorrhoids, history of GERD, and vitamin D deficiency.  Meds listed included Ozempic she is on the 1 mg dose once a week, buspirone 10 mg once a day as needed, omeprazole 40 mg a day.  Lisinopril 2.5 mg a day and Lipitor 10 mg a day.  Cyclobenzaprine 10 mg twice a day as needed, metformin 500 mg once a day, Valtrex 500 mg once a day, and hydroxyzine 10 mg 3 times a day as needed.  Allergies are to tramadol and Percocet and penicillin.  Patient apparently when was seen was about to start her on Ozempic again as she had been off of it for about 6 to 8 weeks.  She was having some issues with the prior pharmacy.  Past surgeries include  , ovary cyst surgery , EGD colonoscopy 2023.  Social history not a smoker and occasional social alcohol.  Mainly in the form of wine.  Plan 1.  Not appearing to have advanced fibrosis. 2. Had some improvement with ozempic but not enough for fat and little weight gain off and need to look and see rybelsus works or not. 3. Pt needs diet and exercise and even 30 min of walking a day, She is walking little more.  4. Aug to do the next u.s and labs and reasess. 5. Doing hep a and b locally and did 2of the 3 doses.   Duration of the visit was 50 minutes with 10 minutes of chart prep and loading the labs and notes to chart from karely and Dr AYERS's office and then 40  minutes of face to face visit with time spent reviewing historical and recent records, discussing their current status relative to same and reviewing future plans for the patient and her family.

## 2024-08-01 ENCOUNTER — LAB OUTSIDE AN ENCOUNTER (OUTPATIENT)
Dept: URBAN - METROPOLITAN AREA CLINIC 86 | Facility: CLINIC | Age: 49
End: 2024-08-01

## 2024-08-13 ENCOUNTER — TELEPHONE ENCOUNTER (OUTPATIENT)
Dept: URBAN - METROPOLITAN AREA CLINIC 86 | Facility: CLINIC | Age: 49
End: 2024-08-13

## 2024-08-13 NOTE — HPI-TODAY'S VISIT:
Dear Rebecca Manrique, August 9, 2024 ultrasound from [facility] imaging shows liver to be increased in echogenicity but with no masses. No dilated bile ducts seen. Gallbladder showed no stones or pericholecystic fluid. Pancreas were visualized appeared grossly normal. Right and left kidney showed no hydronephrosis. Spleen was within normal limits. Upper IVC and aorta were grossly within normal limits. Liver vessels and splenic vessels were patent with normal directional flow. Overall they felt that the liver was echogenic and most likely fatty and they could not rule out coexisting fibrotic inflammatory changes. As you recall, you previously did in February the elastography studiy which showed at site 1 amedian value of 3.68 and at site 2, median value of 0.58.  Both are less than 5.4 kPa so low fibrosis suspected then. We need to see what your labs are doing at this next visit talk about possibly doing an MRI with fat quant instead to look at that issue. Lets discuss this at your visit coming up. Dr. Morrow

## 2024-08-23 ENCOUNTER — DASHBOARD ENCOUNTERS (OUTPATIENT)
Age: 49
End: 2024-08-23

## 2024-08-23 ENCOUNTER — OFFICE VISIT (OUTPATIENT)
Dept: URBAN - METROPOLITAN AREA TELEHEALTH 2 | Facility: TELEHEALTH | Age: 49
End: 2024-08-23
Payer: COMMERCIAL

## 2024-08-23 DIAGNOSIS — Z79.899 HIGH RISK MEDICATION USE: ICD-10-CM

## 2024-08-23 DIAGNOSIS — K76.0 FATTY LIVER: ICD-10-CM

## 2024-08-23 DIAGNOSIS — E11.69 TYPE 2 DIABETES MELLITUS WITH OTHER SPECIFIED COMPLICATION, WITHOUT LONG-TERM CURRENT USE OF INSULIN: ICD-10-CM

## 2024-08-23 DIAGNOSIS — K21.00 GASTROESOPHAGEAL REFLUX DISEASE WITH ESOPHAGITIS WITHOUT HEMORRHAGE: ICD-10-CM

## 2024-08-23 PROCEDURE — 99214 OFFICE O/P EST MOD 30 MIN: CPT | Performed by: PHYSICIAN ASSISTANT

## 2024-08-23 RX ORDER — VALACYCLOVIR HCL 500 MG
1 TABLET TABLET ORAL ONCE A DAY
Refills: 0 | Status: ON HOLD | COMMUNITY
Start: 1900-01-01

## 2024-08-23 RX ORDER — OMEPRAZOLE 40 MG/1
1 CAPSULE 30 MINUTES BEFORE MORNING MEAL CAPSULE, DELAYED RELEASE ORAL ONCE A DAY
Qty: 30 | Refills: 3 | Status: ACTIVE | COMMUNITY
Start: 2023-12-06

## 2024-08-23 RX ORDER — METFORMIN HYDROCHLORIDE 500 MG/1
1 TABLET WITH EVENING MEAL TABLET, EXTENDED RELEASE ORAL ONCE A DAY
Status: ACTIVE | COMMUNITY

## 2024-08-23 RX ORDER — ATORVASTATIN CALCIUM 10 MG/1
1 TABLET TABLET, FILM COATED ORAL ONCE A DAY
Status: ACTIVE | COMMUNITY

## 2024-08-23 RX ORDER — LISINOPRIL 2.5 MG/1
1 TABLET TABLET ORAL ONCE A DAY
Status: ACTIVE | COMMUNITY

## 2024-08-23 NOTE — HPI-TODAY'S VISIT:
Patient is a 48-year-old female who we are being asked to see Mar 2024 for abnormal liver labs and suspicious for fatty liver.  A copy of the note will be sent to the referring provider.  24 ultrasound from [facility] imaging shows liver to be increased in echogenicity but with no masses. No dilated bile ducts seen. Gallbladder showed no stones or pericholecystic fluid. Pancreas were visualized appeared grossly normal.  Right and left kidney showed no hydronephrosis.  Spleen was within normal limits.  Upper IVC and aorta were grossly within normal limits.  Liver vessels and splenic vessels were patent with normal directional flow.  Overall they felt that the liver was echogenic and most likely fatty and they could not rule out coexisting fibrotic inflammatory changes.  As you recall, you previously did in February the elastography studiy which showed at site 1 amedian value of 3.68 and at site 2, median value of 0.58. Both are less than 5.4 kPa so low fibrosis suspected then.  We need to see what your labs are doing at this next visit talk about possibly doing an MRI with fat quant instead to look at that issue.  Lets discuss this at your visit coming up.  she is working on the weight and hard and she is looking into a dietician  she has lost some and encouraged   recap  u.s:Elastography showed :Site 1: median 3.68 kPa and mean 3.58 kPa with IQR 0.36 and desired less than 0.3. Site 2: median 0.58 kPa and Mean 1.67 kpa and IQR 0.22 and desired less than 0.3. They felt that Site 2 values were adequate and mild or no stiffness category of less than 5.4 kPa as those were felt to be the more desired reading quality. The standard ultrasound portion ultrasound report: Liver measures 16.6 cm and was moderately and diffusely increased hepatic parenchymal echotexture most consistent with fatty liver. No focal lesions were seen. No intrahepatic bile duct dilation seen. Liver vessels were patent with normal directional flow. Hepatic artery was also patent. Splenic vessels were patent. Visualized portions of the IVC were patent. Gallbladder showed no gallstones or gallbladder sludge. Mg sign was negative. Common bile duct was 0.4 cm which is within normal limits. Right kidney measured 11.4 cm and left kidney 11.7 cm with no focal renal lesions. No hydronephrosis. Visualized portions of pancreas appeared unremarkable. Spleen was 7.6 cm with no focal splenic lesions. Visualized portions of the abdominal aorta and IVC again appeared unremarkable. In summary, they felt that the liver had fatty liver findings. We will discuss this further with you at your visit.  : hepatitis c ab was not reactive and good to note. Hep b surface antigen not reactive. Hepattiis b core ab total negative indicating no prior exposure to this. Hepatitis B surface ab not immune and need to consider getting hepatitis b vaccine series. No immunity to hepatitis A seen and consider getting hepatitis A vaccine series. There is a combined a and b vaccine series to help cover both called Twinrix. Total protein 7.0 and alb 4.3 and tb 0.6 and direct 0.2 and alk phos 107. ast 16 and alt 26 and idea alt less than 25. Fib 4 index 0.58 for advanced fibrosis as it is less than 1.3. Glucose was 95 BUN of 10 creatinine 0.69 sodium 137 potassium 4.6 calcium 9.8 albumin 4.3. Platelet count was 271.  WBC 6.5 hemoglobin 12.8 platelet count was normal at 271.  MCV was 90.8 with normal neutrophils and lymphocytes. In summary, I am pleased to see that the fib 4 index was low suggesting that you  have a low risk to have advanced fibrosis.  The liver labs are actually almost at ideal range and that the ALT is 26 just above the cutoff of 25 for ideal. We need to get the hepatitis A and B vaccine series to be considered to get immunity for that and we await that ultrasound with elastography to assess for fibrosis estimate to be done.   Patient was last seen in 2023 by Dr. Tolbert at the [place] office for evaluation of GERD/colonoscopy consultation.  Patient listed as being ordered to be on Ozempic at the time for her diabetes and they talked about some timing of it to be held for her procedure.  Since then she started the ozempic 0.25mg.  She started that dose  and now back off it and cost was issue and so not on it and trial rebylsus for this and has helped.   She had been prior on it from 3m summer to  and she went to 1mg dose and then pharmacy had not been able to get that.  She lost 10 or so with that ozempic tx.  Never titrated.  Ozempic and mounjaro looking gong label soon this or next year.  [facility] notes:  They mention that she has a history of elevated BMI and type 2 diabetes and was at risk for fatty liver. They did not really discuss it.  Weight at the time was listed as being 262 with a height of 65 and BMI 43.59.  /  Patient listed as having potential risk factors for fatty liver being her obesity, and the pre/diabetes issues.  2023 EGD done showed grade B esophagitis in the stomach appeared normal as well as the duodenum.  There was some scalloped mucosa in the first part of duodenum with scalloping close the second part duodenum and biopsies were done to rule out celiac disease.  Colonoscopy also done same day showed exam was otherwise normal and no specimens were obtained from the colon.  Path report showed from duodenum small bowel mucosa with no significant histopathology and no increase in intra epithelial lymphocytes and no evidence of celiac disease or infectious microorganisms.  Distal esophagus biopsies showed some changes of reflux esophagitis and squamocolumnar junctional mucosa.  Approximately 23 pages of material were sent from Dr. AYERS's office for review.  End of dec 2023 ast 24 and alt 47 and alk 134.  On 2023 patient noted to have glucose 107 BUN of 12 creatinine 0.72 sodium 142 potassium 4.7 chloride 104 CO2 23 calcium 9.9 albumin 4.5 bilirubin 0.4 alk phos 134 AST of 24 and ALT of 47.  Hep B IgM negative B surface antigen negative B core IgM negative and hep C antibody negative.  Alkaline phosphatase was fractionated as it was elevated and was 44% liver, bone 17% and curiously 39% elevated from intestinal fraction. Other labs that we saw showed glucose 158, BUN of 10 creatinine 0.74 sodium 138 potassium 4.2 albumin 3.9 bilirubin 0.5 alk phos 125 AST of 22 ALT 45 cholesterol 110 triglycerides 118 HDL 45 LDL 44 and these appear to be from .  A1c was 6.2%.  Last clinic visit note that we could see here from January shows the patient has a height of 65 inches and a weight of 261 and a BMI of 43.43.  Medical problems this recent upper respiratory tract infection, hypertension, right trapezius muscle spasm, morbid obesity, carpal tunnel syndrome bilaterally, history of migraines, diabetes, glaucoma, family history ovarian cancer, history of anxiety, history of HSV, history of hemorrhoids, history of GERD, and vitamin D deficiency.  Meds listed included Ozempic she is on the 1 mg dose once a week, buspirone 10 mg once a day as needed, omeprazole 40 mg a day.  Lisinopril 2.5 mg a day and Lipitor 10 mg a day.  Cyclobenzaprine 10 mg twice a day as needed, metformin 500 mg once a day, Valtrex 500 mg once a day, and hydroxyzine 10 mg 3 times a day as needed.  Allergies are to tramadol and Percocet and penicillin.  Patient apparently when was seen was about to start her on Ozempic again as she had been off of it for about 6 to 8 weeks.  She was having some issues with the prior pharmacy.  Past surgeries include  , ovary cyst surgery , EGD colonoscopy 2023.  Social history not a smoker and occasional social alcohol.  Mainly in the form of wine.

## 2024-09-13 ENCOUNTER — OFFICE VISIT (OUTPATIENT)
Dept: URBAN - METROPOLITAN AREA CLINIC 19 | Facility: CLINIC | Age: 49
End: 2024-09-13

## 2024-09-13 RX ORDER — METFORMIN HYDROCHLORIDE 500 MG/1
1 TABLET WITH EVENING MEAL TABLET, EXTENDED RELEASE ORAL ONCE A DAY
Status: ACTIVE | COMMUNITY

## 2024-09-13 RX ORDER — LISINOPRIL 2.5 MG/1
1 TABLET TABLET ORAL ONCE A DAY
Status: ACTIVE | COMMUNITY

## 2024-09-13 RX ORDER — OMEPRAZOLE 40 MG/1
1 CAPSULE 30 MINUTES BEFORE MORNING MEAL CAPSULE, DELAYED RELEASE ORAL ONCE A DAY
Qty: 30 | Refills: 3 | Status: ACTIVE | COMMUNITY
Start: 2023-12-06

## 2024-09-13 RX ORDER — VALACYCLOVIR HCL 500 MG
1 TABLET TABLET ORAL ONCE A DAY
Refills: 0 | Status: ON HOLD | COMMUNITY
Start: 1900-01-01

## 2024-09-13 RX ORDER — ATORVASTATIN CALCIUM 10 MG/1
1 TABLET TABLET, FILM COATED ORAL ONCE A DAY
Status: ACTIVE | COMMUNITY

## 2024-10-17 ENCOUNTER — TELEPHONE ENCOUNTER (OUTPATIENT)
Dept: URBAN - METROPOLITAN AREA CLINIC 86 | Facility: CLINIC | Age: 49
End: 2024-10-17

## 2024-10-17 NOTE — HPI-TODAY'S VISIT:
Dear Ms. Manrique,  THe recent mri was sent to me.  The liver appeared normal in size.  They saw mild diffuse fat.  This was more pronounced on the right side.  They saw an area of fat sparing adjacent to the gallbladder fossa.  They did not see any suspicious lesions.  The fat quantification was 14% which is compatible with mild fatty liver.  This ranges from 6 to 26%.  Gallbladder unremarkable.  Spleen normal.  Pancreas mildly atrophic for your age and recommend sharing with your primary care.  The hepatic vascular is patent.  They did see a fibroid and recommend sharing that with your primary care.  Overall they saw fatty liver.  Need to continue to work on this. Ursula Kelsey PA-C

## 2024-11-01 ENCOUNTER — LAB OUTSIDE AN ENCOUNTER (OUTPATIENT)
Dept: URBAN - METROPOLITAN AREA TELEHEALTH 2 | Facility: TELEHEALTH | Age: 49
End: 2024-11-01

## 2024-12-02 ENCOUNTER — LAB OUTSIDE AN ENCOUNTER (OUTPATIENT)
Dept: URBAN - METROPOLITAN AREA TELEHEALTH 2 | Facility: TELEHEALTH | Age: 49
End: 2024-12-02

## 2024-12-20 ENCOUNTER — OFFICE VISIT (OUTPATIENT)
Dept: URBAN - METROPOLITAN AREA TELEHEALTH 2 | Facility: TELEHEALTH | Age: 49
End: 2024-12-20
Payer: COMMERCIAL

## 2024-12-20 VITALS — BODY MASS INDEX: 40.15 KG/M2 | WEIGHT: 241 LBS | HEIGHT: 65 IN

## 2024-12-20 DIAGNOSIS — E66.01 MORBID OBESITY: ICD-10-CM

## 2024-12-20 DIAGNOSIS — I10 ESSENTIAL HYPERTENSION: ICD-10-CM

## 2024-12-20 DIAGNOSIS — E11.69 TYPE 2 DIABETES MELLITUS WITH OTHER SPECIFIED COMPLICATION, WITHOUT LONG-TERM CURRENT USE OF INSULIN: ICD-10-CM

## 2024-12-20 DIAGNOSIS — Z71.85 VACCINE COUNSELING: ICD-10-CM

## 2024-12-20 DIAGNOSIS — K76.0 FATTY LIVER: ICD-10-CM

## 2024-12-20 DIAGNOSIS — Z79.899 HIGH RISK MEDICATION USE: ICD-10-CM

## 2024-12-20 DIAGNOSIS — K21.00 GASTROESOPHAGEAL REFLUX DISEASE WITH ESOPHAGITIS WITHOUT HEMORRHAGE: ICD-10-CM

## 2024-12-20 DIAGNOSIS — E78.2 MIXED HYPERLIPIDEMIA: ICD-10-CM

## 2024-12-20 PROCEDURE — 99214 OFFICE O/P EST MOD 30 MIN: CPT | Performed by: PHYSICIAN ASSISTANT

## 2024-12-20 RX ORDER — ATORVASTATIN CALCIUM 10 MG/1
1 TABLET TABLET, FILM COATED ORAL ONCE A DAY
Status: ACTIVE | COMMUNITY

## 2024-12-20 RX ORDER — OMEPRAZOLE 40 MG/1
1 CAPSULE 30 MINUTES BEFORE MORNING MEAL CAPSULE, DELAYED RELEASE ORAL ONCE A DAY
Qty: 30 | Refills: 3 | Status: ACTIVE | COMMUNITY
Start: 2023-12-06

## 2024-12-20 RX ORDER — LISINOPRIL 2.5 MG/1
1 TABLET TABLET ORAL ONCE A DAY
Status: ACTIVE | COMMUNITY

## 2024-12-20 RX ORDER — METFORMIN HYDROCHLORIDE 500 MG/1
1 TABLET WITH EVENING MEAL TABLET, EXTENDED RELEASE ORAL ONCE A DAY
Status: ACTIVE | COMMUNITY

## 2024-12-20 RX ORDER — VALACYCLOVIR HCL 500 MG
1 TABLET TABLET ORAL ONCE A DAY
Refills: 0 | Status: ON HOLD | COMMUNITY
Start: 1900-01-01

## 2024-12-20 NOTE — HPI-TODAY'S VISIT:
Patient is a 48-year-old female who we are being asked to see Mar 2024 for abnormal liver labs and suspicious for fatty liver.  A copy of the note will be sent to the referring provider.   Dear MsSkyler Hilaria,  THe recent mri was sent to me. The liver appeared normal in size. They saw mild diffuse fat. This was more pronounced on the right side. They saw an area of fat sparing adjacent to the gallbladder fossa. They did not see any suspicious lesions. The fat quantification was 14% which is compatible with mild fatty liver. This ranges from 6 to 26%. Gallbladder unremarkable. Spleen normal. Pancreas mildly atrophic for your age and recommend sharing with your primary care. The hepatic vascular is patent. They did see a fibroid and recommend sharing that with your primary care. Overall they saw fatty liver. Need to continue to work on this.  Ursula Kelsey PA-C  she is down to 241 from 269 a1c 5.9 improved she had a us in the office at [facility] ast and alt lower and goal less than 25. ast 18 and alt 30. goal less than 25 recap 24 ultrasound from [facility] shows liver to be increased in echogenicity but with no masses. No dilated bile ducts seen. Gallbladder showed no stones or pericholecystic fluid. Pancreas were visualized appeared grossly normal.  Right and left kidney showed no hydronephrosis.  Spleen was within normal limits.  Upper IVC and aorta were grossly within normal limits.  Liver vessels and splenic vessels were patent with normal directional flow.  Overall they felt that the liver was echogenic and most likely fatty and they could not rule out coexisting fibrotic inflammatory changes.  As you recall, you previously did in February the elastography studiy which showed at site 1 amedian value of 3.68 and at site 2, median value of 0.58. Both are less than 5.4 kPa so low fibrosis suspected then.  We need to see what your labs are doing at this next visit talk about possibly doing an MRI with fat quant instead to look at that issue.  Lets discuss this at your visit coming up.  she is working on the weight and hard and she is looking into a dietician  she has lost some and encouraged   recap  u.s:Elastography showed :Site 1: median 3.68 kPa and mean 3.58 kPa with IQR 0.36 and desired less than 0.3. Site 2: median 0.58 kPa and Mean 1.67 kpa and IQR 0.22 and desired less than 0.3. They felt that Site 2 values were adequate and mild or no stiffness category of less than 5.4 kPa as those were felt to be the more desired reading quality. The standard ultrasound portion ultrasound report: Liver measures 16.6 cm and was moderately and diffusely increased hepatic parenchymal echotexture most consistent with fatty liver. No focal lesions were seen. No intrahepatic bile duct dilation seen. Liver vessels were patent with normal directional flow. Hepatic artery was also patent. Splenic vessels were patent. Visualized portions of the IVC were patent. Gallbladder showed no gallstones or gallbladder sludge. Mg sign was negative. Common bile duct was 0.4 cm which is within normal limits. Right kidney measured 11.4 cm and left kidney 11.7 cm with no focal renal lesions. No hydronephrosis. Visualized portions of pancreas appeared unremarkable. Spleen was 7.6 cm with no focal splenic lesions. Visualized portions of the abdominal aorta and IVC again appeared unremarkable. In summary, they felt that the liver had fatty liver findings. We will discuss this further with you at your visit.  : hepatitis c ab was not reactive and good to note. Hep b surface antigen not reactive. Hepattiis b core ab total negative indicating no prior exposure to this. Hepatitis B surface ab not immune and need to consider getting hepatitis b vaccine series. No immunity to hepatitis A seen and consider getting hepatitis A vaccine series. There is a combined a and b vaccine series to help cover both called Twinrix. Total protein 7.0 and alb 4.3 and tb 0.6 and direct 0.2 and alk phos 107. ast 16 and alt 26 and idea alt less than 25. Fib 4 index 0.58 for advanced fibrosis as it is less than 1.3. Glucose was 95 BUN of 10 creatinine 0.69 sodium 137 potassium 4.6 calcium 9.8 albumin 4.3. Platelet count was 271.  WBC 6.5 hemoglobin 12.8 platelet count was normal at 271.  MCV was 90.8 with normal neutrophils and lymphocytes. In summary, I am pleased to see that the fib 4 index was low suggesting that you  have a low risk to have advanced fibrosis.  The liver labs are actually almost at ideal range and that the ALT is 26 just above the cutoff of 25 for ideal. We need to get the hepatitis A and B vaccine series to be considered to get immunity for that and we await that ultrasound with elastography to assess for fibrosis estimate to be done.   Patient was last seen in 2023 by Dr. Tolbert at the [place] office for evaluation of GERD/colonoscopy consultation.  Patient listed as being ordered to be on Ozempic at the time for her diabetes and they talked about some timing of it to be held for her procedure.  Since then she started the ozempic 0.25mg.  She started that dose  and now back off it and cost was issue and so not on it and trial rebylsus for this and has helped.   She had been prior on it from 3m summer to  and she went to 1mg dose and then pharmacy had not been able to get that.  She lost 10 or so with that ozempic tx.  Never titrated.  Ozempic and mounjaro looking gong label soon this or next year.  [facility] notes:  They mention that she has a history of elevated BMI and type 2 diabetes and was at risk for fatty liver. They did not really discuss it.  Weight at the time was listed as being 262 with a height of 65 and BMI 43.59.  /  Patient listed as having potential risk factors for fatty liver being her obesity, and the pre/diabetes issues.  2023 EGD done showed grade B esophagitis in the stomach appeared normal as well as the duodenum.  There was some scalloped mucosa in the first part of duodenum with scalloping close the second part duodenum and biopsies were done to rule out celiac disease.  Colonoscopy also done same day showed exam was otherwise normal and no specimens were obtained from the colon.  Path report showed from duodenum small bowel mucosa with no significant histopathology and no increase in intra epithelial lymphocytes and no evidence of celiac disease or infectious microorganisms.  Distal esophagus biopsies showed some changes of reflux esophagitis and squamocolumnar junctional mucosa.  Approximately 23 pages of material were sent from Dr. AYERS's office for review.  End of dec 2023 ast 24 and alt 47 and alk 134.  On 2023 patient noted to have glucose 107 BUN of 12 creatinine 0.72 sodium 142 potassium 4.7 chloride 104 CO2 23 calcium 9.9 albumin 4.5 bilirubin 0.4 alk phos 134 AST of 24 and ALT of 47.  Hep B IgM negative B surface antigen negative B core IgM negative and hep C antibody negative.  Alkaline phosphatase was fractionated as it was elevated and was 44% liver, bone 17% and curiously 39% elevated from intestinal fraction. Other labs that we saw showed glucose 158, BUN of 10 creatinine 0.74 sodium 138 potassium 4.2 albumin 3.9 bilirubin 0.5 alk phos 125 AST of 22 ALT 45 cholesterol 110 triglycerides 118 HDL 45 LDL 44 and these appear to be from .  A1c was 6.2%.  Last clinic visit note that we could see here from January shows the patient has a height of 65 inches and a weight of 261 and a BMI of 43.43.  Medical problems this recent upper respiratory tract infection, hypertension, right trapezius muscle spasm, morbid obesity, carpal tunnel syndrome bilaterally, history of migraines, diabetes, glaucoma, family history ovarian cancer, history of anxiety, history of HSV, history of hemorrhoids, history of GERD, and vitamin D deficiency.  Meds listed included Ozempic she is on the 1 mg dose once a week, buspirone 10 mg once a day as needed, omeprazole 40 mg a day.  Lisinopril 2.5 mg a day and Lipitor 10 mg a day.  Cyclobenzaprine 10 mg twice a day as needed, metformin 500 mg once a day, Valtrex 500 mg once a day, and hydroxyzine 10 mg 3 times a day as needed.  Allergies are to tramadol and Percocet and penicillin.  Patient apparently when was seen was about to start her on Ozempic again as she had been off of it for about 6 to 8 weeks.  She was having some issues with the prior pharmacy.  Past surgeries include  , ovary cyst surgery , EGD colonoscopy 2023.  Social history not a smoker and occasional social alcohol.  Mainly in the form of wine.

## 2025-03-20 ENCOUNTER — LAB OUTSIDE AN ENCOUNTER (OUTPATIENT)
Dept: URBAN - METROPOLITAN AREA TELEHEALTH 2 | Facility: TELEHEALTH | Age: 50
End: 2025-03-20

## 2025-04-16 ENCOUNTER — TELEPHONE ENCOUNTER (OUTPATIENT)
Dept: URBAN - METROPOLITAN AREA CLINIC 86 | Facility: CLINIC | Age: 50
End: 2025-04-16

## 2025-04-16 NOTE — HPI-TODAY'S VISIT:
Dear Rebecca Manrique,  The recent labs are sent to us in the complete blood count overall was normal.  The white blood cells appeared normal and the hemoglobin was normal along with the platelets.  Platelets 247.  The glucose 93, creatinine 0.78, bilirubin 0.9, alkaline phosphatase 101, AST 23 and ALT 33.  Goal for the AST and ALT is less than 25.  Prior back in January 2024 the AST was 16 and ALT was 26.  And then the back and the last time we spoke in December the AST was 18 and ALT was 30.  Will continue to monitor this.  I think we were doing the next imaging in June.  Let us know if there are issues.  Cass Kelsey PA-C

## 2025-06-02 ENCOUNTER — LAB OUTSIDE AN ENCOUNTER (OUTPATIENT)
Dept: URBAN - METROPOLITAN AREA TELEHEALTH 2 | Facility: TELEHEALTH | Age: 50
End: 2025-06-02

## 2025-06-17 ENCOUNTER — OFFICE VISIT (OUTPATIENT)
Dept: URBAN - METROPOLITAN AREA TELEHEALTH 2 | Facility: TELEHEALTH | Age: 50
End: 2025-06-17
Payer: COMMERCIAL

## 2025-06-17 DIAGNOSIS — Z71.85 VACCINE COUNSELING: ICD-10-CM

## 2025-06-17 DIAGNOSIS — Z79.899 HIGH RISK MEDICATION USE: ICD-10-CM

## 2025-06-17 DIAGNOSIS — K76.0 FATTY LIVER: ICD-10-CM

## 2025-06-17 DIAGNOSIS — I10 ESSENTIAL HYPERTENSION: ICD-10-CM

## 2025-06-17 DIAGNOSIS — E66.01 MORBID OBESITY: ICD-10-CM

## 2025-06-17 DIAGNOSIS — E78.2 MIXED HYPERLIPIDEMIA: ICD-10-CM

## 2025-06-17 DIAGNOSIS — E11.69 TYPE 2 DIABETES MELLITUS WITH OTHER SPECIFIED COMPLICATION, WITHOUT LONG-TERM CURRENT USE OF INSULIN: ICD-10-CM

## 2025-06-17 DIAGNOSIS — K21.00 GASTROESOPHAGEAL REFLUX DISEASE WITH ESOPHAGITIS WITHOUT HEMORRHAGE: ICD-10-CM

## 2025-06-17 PROCEDURE — 99214 OFFICE O/P EST MOD 30 MIN: CPT | Performed by: PHYSICIAN ASSISTANT

## 2025-06-17 RX ORDER — VALACYCLOVIR HCL 500 MG
1 TABLET TABLET ORAL ONCE A DAY
Refills: 0 | Status: ON HOLD | COMMUNITY
Start: 1900-01-01

## 2025-06-17 RX ORDER — OMEPRAZOLE 40 MG/1
1 CAPSULE 30 MINUTES BEFORE MORNING MEAL CAPSULE, DELAYED RELEASE ORAL ONCE A DAY
Qty: 30 | Refills: 3 | Status: ACTIVE | COMMUNITY
Start: 2023-12-06

## 2025-06-17 RX ORDER — METFORMIN HYDROCHLORIDE 500 MG/1
1 TABLET WITH EVENING MEAL TABLET, EXTENDED RELEASE ORAL ONCE A DAY
Status: ACTIVE | COMMUNITY

## 2025-06-17 RX ORDER — ATORVASTATIN CALCIUM 10 MG/1
1 TABLET TABLET, FILM COATED ORAL ONCE A DAY
Status: ACTIVE | COMMUNITY

## 2025-06-17 RX ORDER — LISINOPRIL 2.5 MG/1
1 TABLET TABLET ORAL ONCE A DAY
Status: ACTIVE | COMMUNITY

## 2025-06-17 NOTE — HPI-TODAY'S VISIT:
Patient is a 48-year-old female who we are being asked to see Mar 2024 for abnormal liver labs and suspicious for fatty liver.  A copy of the note will be sent to the referring provider.  25 Dear Rebecca Manrique,  The recent labs are sent to us in the complete blood count overall was normal. The white blood cells appeared normal and the hemoglobin was normal along with the platelets. Platelets 247. The glucose 93, creatinine 0.78, bilirubin 0.9, alkaline phosphatase 101, AST 23 and ALT 33. Goal for the AST and ALT is less than 25. Prior back in 2024 the AST was 16 and ALT was 26. And then the back and the last time we spoke in December the AST was 18 and ALT was 30. Will continue to monitor this.  I think we were doing the next imaging in . Let us know if there are issues.  Cass Kelsey PA-C  may 2025 labs w pcp .7, alp 124, ast 23, alt 36 she is now off ozempic and now on mounjaro she is now takign lexapro vitamin d low  she has been on mounjaro x 2 months and low dose.    Dear Ms. Manrique,  THe recent mri was sent to me. The liver appeared normal in size. They saw mild diffuse fat. This was more pronounced on the right side. They saw an area of fat sparing adjacent to the gallbladder fossa. They did not see any suspicious lesions. The fat quantification was 14% which is compatible with mild fatty liver. This ranges from 6 to 26%. Gallbladder unremarkable. Spleen normal. Pancreas mildly atrophic for your age and recommend sharing with your primary care. The hepatic vascular is patent. They did see a fibroid and recommend sharing that with your primary care. Overall they saw fatty liver. Need to continue to work on this.  Ursula Kelsey PA-C  she is down to 241 from 269 a1c 5.9 improved she had a us in the office at [facility] ast and alt lower and goal less than 25. ast 18 and alt 30. goal less than 25 recap 24 ultrasound from [facility] shows liver to be increased in echogenicity but with no masses. No dilated bile ducts seen. Gallbladder showed no stones or pericholecystic fluid. Pancreas were visualized appeared grossly normal.  Right and left kidney showed no hydronephrosis.  Spleen was within normal limits.  Upper IVC and aorta were grossly within normal limits.  Liver vessels and splenic vessels were patent with normal directional flow.  Overall they felt that the liver was echogenic and most likely fatty and they could not rule out coexisting fibrotic inflammatory changes.  As you recall, you previously did in February the elastography studiy which showed at site 1 amedian value of 3.68 and at site 2, median value of 0.58. Both are less than 5.4 kPa so low fibrosis suspected then.  We need to see what your labs are doing at this next visit talk about possibly doing an MRI with fat quant instead to look at that issue.  Lets discuss this at your visit coming up.  she is working on the weight and hard and she is looking into a dietician  she has lost some and encouraged   recap  u.s:Elastography showed :Site 1: median 3.68 kPa and mean 3.58 kPa with IQR 0.36 and desired less than 0.3. Site 2: median 0.58 kPa and Mean 1.67 kpa and IQR 0.22 and desired less than 0.3. They felt that Site 2 values were adequate and mild or no stiffness category of less than 5.4 kPa as those were felt to be the more desired reading quality. The standard ultrasound portion ultrasound report: Liver measures 16.6 cm and was moderately and diffusely increased hepatic parenchymal echotexture most consistent with fatty liver. No focal lesions were seen. No intrahepatic bile duct dilation seen. Liver vessels were patent with normal directional flow. Hepatic artery was also patent. Splenic vessels were patent. Visualized portions of the IVC were patent. Gallbladder showed no gallstones or gallbladder sludge. Mg sign was negative. Common bile duct was 0.4 cm which is within normal limits. Right kidney measured 11.4 cm and left kidney 11.7 cm with no focal renal lesions. No hydronephrosis. Visualized portions of pancreas appeared unremarkable. Spleen was 7.6 cm with no focal splenic lesions. Visualized portions of the abdominal aorta and IVC again appeared unremarkable. In summary, they felt that the liver had fatty liver findings. We will discuss this further with you at your visit.  : hepatitis c ab was not reactive and good to note. Hep b surface antigen not reactive. Hepattiis b core ab total negative indicating no prior exposure to this. Hepatitis B surface ab not immune and need to consider getting hepatitis b vaccine series. No immunity to hepatitis A seen and consider getting hepatitis A vaccine series. There is a combined a and b vaccine series to help cover both called Twinrix. Total protein 7.0 and alb 4.3 and tb 0.6 and direct 0.2 and alk phos 107. ast 16 and alt 26 and idea alt less than 25. Fib 4 index 0.58 for advanced fibrosis as it is less than 1.3. Glucose was 95 BUN of 10 creatinine 0.69 sodium 137 potassium 4.6 calcium 9.8 albumin 4.3. Platelet count was 271.  WBC 6.5 hemoglobin 12.8 platelet count was normal at 271.  MCV was 90.8 with normal neutrophils and lymphocytes. In summary, I am pleased to see that the fib 4 index was low suggesting that you  have a low risk to have advanced fibrosis.  The liver labs are actually almost at ideal range and that the ALT is 26 just above the cutoff of 25 for ideal. We need to get the hepatitis A and B vaccine series to be considered to get immunity for that and we await that ultrasound with elastography to assess for fibrosis estimate to be done.   Patient was last seen in 2023 by Dr. Tolbert at the [place] office for evaluation of GERD/colonoscopy consultation.  Patient listed as being ordered to be on Ozempic at the time for her diabetes and they talked about some timing of it to be held for her procedure.  Since then she started the ozempic 0.25mg.  She started that dose  and now back off it and cost was issue and so not on it and trial rebylsus for this and has helped.   She had been prior on it from 3m summer to  and she went to 1mg dose and then pharmacy had not been able to get that.  She lost 10 or so with that ozempic tx.  Never titrated.  Ozempic and mounjaro looking gong label soon this or next year.  [facility] notes:  They mention that she has a history of elevated BMI and type 2 diabetes and was at risk for fatty liver. They did not really discuss it.  Weight at the time was listed as being 262 with a height of 65 and BMI 43.59.  /  Patient listed as having potential risk factors for fatty liver being her obesity, and the pre/diabetes issues.  2023 EGD done showed grade B esophagitis in the stomach appeared normal as well as the duodenum.  There was some scalloped mucosa in the first part of duodenum with scalloping close the second part duodenum and biopsies were done to rule out celiac disease.  Colonoscopy also done same day showed exam was otherwise normal and no specimens were obtained from the colon.  Path report showed from duodenum small bowel mucosa with no significant histopathology and no increase in intra epithelial lymphocytes and no evidence of celiac disease or infectious microorganisms.  Distal esophagus biopsies showed some changes of reflux esophagitis and squamocolumnar junctional mucosa.  Approximately 23 pages of material were sent from Dr. AYERS's office for review.  End of dec 2023 ast 24 and alt 47 and alk 134.  On 2023 patient noted to have glucose 107 BUN of 12 creatinine 0.72 sodium 142 potassium 4.7 chloride 104 CO2 23 calcium 9.9 albumin 4.5 bilirubin 0.4 alk phos 134 AST of 24 and ALT of 47.  Hep B IgM negative B surface antigen negative B core IgM negative and hep C antibody negative.  Alkaline phosphatase was fractionated as it was elevated and was 44% liver, bone 17% and curiously 39% elevated from intestinal fraction. Other labs that we saw showed glucose 158, BUN of 10 creatinine 0.74 sodium 138 potassium 4.2 albumin 3.9 bilirubin 0.5 alk phos 125 AST of 22 ALT 45 cholesterol 110 triglycerides 118 HDL 45 LDL 44 and these appear to be from .  A1c was 6.2%.  Last clinic visit note that we could see here from January shows the patient has a height of 65 inches and a weight of 261 and a BMI of 43.43.  Medical problems this recent upper respiratory tract infection, hypertension, right trapezius muscle spasm, morbid obesity, carpal tunnel syndrome bilaterally, history of migraines, diabetes, glaucoma, family history ovarian cancer, history of anxiety, history of HSV, history of hemorrhoids, history of GERD, and vitamin D deficiency.  Meds listed included Ozempic she is on the 1 mg dose once a week, buspirone 10 mg once a day as needed, omeprazole 40 mg a day.  Lisinopril 2.5 mg a day and Lipitor 10 mg a day.  Cyclobenzaprine 10 mg twice a day as needed, metformin 500 mg once a day, Valtrex 500 mg once a day, and hydroxyzine 10 mg 3 times a day as needed.  Allergies are to tramadol and Percocet and penicillin.  Patient apparently when was seen was about to start her on Ozempic again as she had been off of it for about 6 to 8 weeks.  She was having some issues with the prior pharmacy.  Past surgeries include  , ovary cyst surgery , EGD colonoscopy 2023.  Social history not a smoker and occasional social alcohol.  Mainly in the form of wine.

## 2025-08-07 ENCOUNTER — WEB ENCOUNTER (OUTPATIENT)
Dept: URBAN - METROPOLITAN AREA CLINIC 86 | Facility: CLINIC | Age: 50
End: 2025-08-07

## 2025-08-08 ENCOUNTER — WEB ENCOUNTER (OUTPATIENT)
Dept: URBAN - METROPOLITAN AREA CLINIC 86 | Facility: CLINIC | Age: 50
End: 2025-08-08